# Patient Record
Sex: MALE
[De-identification: names, ages, dates, MRNs, and addresses within clinical notes are randomized per-mention and may not be internally consistent; named-entity substitution may affect disease eponyms.]

---

## 2019-09-07 ENCOUNTER — HOSPITAL ENCOUNTER (EMERGENCY)
Dept: HOSPITAL 97 - ER | Age: 44
Discharge: HOME | End: 2019-09-07
Payer: SELF-PAY

## 2019-09-07 VITALS — TEMPERATURE: 98.7 F

## 2019-09-07 VITALS — DIASTOLIC BLOOD PRESSURE: 88 MMHG | SYSTOLIC BLOOD PRESSURE: 159 MMHG | OXYGEN SATURATION: 96 %

## 2019-09-07 DIAGNOSIS — N13.2: Primary | ICD-10-CM

## 2019-09-07 DIAGNOSIS — I10: ICD-10-CM

## 2019-09-07 LAB
ALBUMIN SERPL BCP-MCNC: 4 G/DL (ref 3.4–5)
ALP SERPL-CCNC: 94 U/L (ref 45–117)
ALT SERPL W P-5'-P-CCNC: 33 U/L (ref 12–78)
AST SERPL W P-5'-P-CCNC: 23 U/L (ref 15–37)
BUN BLD-MCNC: 12 MG/DL (ref 7–18)
GLUCOSE SERPLBLD-MCNC: 139 MG/DL (ref 74–106)
HCT VFR BLD CALC: 42.8 % (ref 39.6–49)
LIPASE SERPL-CCNC: 101 U/L (ref 73–393)
LYMPHOCYTES # SPEC AUTO: 1.9 K/UL (ref 0.7–4.9)
PMV BLD: 9.1 FL (ref 7.6–11.3)
POTASSIUM SERPL-SCNC: 3.7 MMOL/L (ref 3.5–5.1)
RBC # BLD: 4.81 M/UL (ref 4.33–5.43)
UA COMPLETE W REFLEX CULTURE PNL UR: (no result)

## 2019-09-07 PROCEDURE — 74176 CT ABD & PELVIS W/O CONTRAST: CPT

## 2019-09-07 PROCEDURE — 99284 EMERGENCY DEPT VISIT MOD MDM: CPT

## 2019-09-07 PROCEDURE — 36415 COLL VENOUS BLD VENIPUNCTURE: CPT

## 2019-09-07 PROCEDURE — 80076 HEPATIC FUNCTION PANEL: CPT

## 2019-09-07 PROCEDURE — 83690 ASSAY OF LIPASE: CPT

## 2019-09-07 PROCEDURE — 80048 BASIC METABOLIC PNL TOTAL CA: CPT

## 2019-09-07 PROCEDURE — 96374 THER/PROPH/DIAG INJ IV PUSH: CPT

## 2019-09-07 PROCEDURE — 81015 MICROSCOPIC EXAM OF URINE: CPT

## 2019-09-07 PROCEDURE — 76377 3D RENDER W/INTRP POSTPROCES: CPT

## 2019-09-07 PROCEDURE — 85025 COMPLETE CBC W/AUTO DIFF WBC: CPT

## 2019-09-07 PROCEDURE — 96375 TX/PRO/DX INJ NEW DRUG ADDON: CPT

## 2019-09-07 NOTE — ER
Nurse's Notes                                                                                     

 [facility]                                                                 

Name: Aamir Rosa                                                                                 

Age: 44 yrs                                                                                       

Sex: Male                                                                                         

: 1975                                                                                   

MRN: [id number]                                                                                   

Arrival Date: 2019                                                                          

Time: 01:21                                                                                       

Account#: [id number]                                                                            

Bed 6                                                                                             

Private MD:                                                                                       

Diagnosis: Hydronephrosis with renal and ureteral calculous obstruction                           

                                                                                                  

Presentation:                                                                                     

                                                                                             

01:44 Presenting complaint: Patient states: he is having right sided abdominal pain radiating bb  

      to the umbilical area for approx 1 hour and vomited x 1, denies dysuria. Transition of      

      care: patient was not received from another setting of care. Onset of symptoms was          

      2019. Risk Assessment: Do you want to hurt yourself or someone else?          

      Patient reports no desire to harm self or others. Initial Sepsis Screen: Does the           

      patient meet any 2 criteria? No. Patient's initial sepsis screen is negative. Does the      

      patient have a suspected source of infection? No. Patient's initial sepsis screen is        

      negative. Care prior to arrival: None.                                                      

01:44 Method Of Arrival: Ambulatory                                                           bb  

01:44 Acuity: CALEB 3                                                                           bb  

                                                                                                  

Triage Assessment:                                                                                

02:26 General: Appears in no apparent distress. Behavior is calm, cooperative. Pain:          ak1 

      Complains of pain in right lower quadrant and left lower quadrant. EENT: No signs           

      and/or symptoms were reported regarding the EENT system. Neuro: Level of Consciousness      

      is awake, alert, obeys commands, Moves all extremities. Gait is steady, Speech is           

      normal. Cardiovascular: No deficits noted. Respiratory: Airway is patent Respiratory        

      effort is even, unlabored, Respiratory pattern is regular. GI: Abdomen is round Bowel       

      sounds present X 4 quads. Reports lower abdominal pain, nausea, vomiting, since 0100.       

      : No signs and/or symptoms were reported regarding the genitourinary system. Derm: No     

      signs and/or symptoms reported regarding the dermatologic system. Musculoskeletal: No       

      signs and/or symptoms reported regarding the musculoskeletal system.                        

                                                                                                  

Historical:                                                                                       

- Allergies:                                                                                      

01:45 No Known Allergies;                                                                     bb  

- Home Meds:                                                                                      

:45 None [Active];                                                                          bb  

- PMHx:                                                                                           

01:45 Hypertension;                                                                           bb  

- PSHx:                                                                                           

01:45 None;                                                                                   bb  

                                                                                                  

- Immunization history:: Adult Immunizations up to date.                                          

- Social history:: Smoking status: Patient/guardian denies using tobacco.                         

- Ebola Screening: : No symptoms or risks identified at this time.                                

                                                                                                  

                                                                                                  

Screenin:25 Abuse screen: Denies threats or abuse. Denies injuries from another. Nutritional        ak1 

      screening: No deficits noted. Tuberculosis screening: No symptoms or risk factors           

      identified. Fall Risk None identified.                                                      

                                                                                                  

Assessment:                                                                                       

02:27 Reassessment: Patient appears in no apparent distress at this time. No changes from     ak1 

      previously documented assessment. Patient and/or family updated on plan of care and         

      expected duration. Pain level reassessed. Patient is alert, oriented x 3, equal             

      unlabored respirations, skin warm/dry/pink. see triage assessment.                          

03:34 Reassessment: Patient appears in no apparent distress at this time. Patient and/or      ak1 

      family updated on plan of care and expected duration. Pain level reassessed. Patient is     

      alert, oriented x 3, equal unlabored respirations, skin warm/dry/pink. Patient states       

      feeling better.                                                                             

                                                                                                  

Vital Signs:                                                                                      

01:45  / 105; Pulse 68; Resp 16 S; Temp 98.7(O); Pulse Ox 94% on R/A; Weight 129.27 kg  bb  

      (R); Height 5 ft. 5 in. (165.10 cm) (R); Pain 9/10;                                         

03:34  / 88; Pulse 68; Resp 16; Temp 98.7; Pulse Ox 96% on R/A;                         ak1 

01:45 Body Mass Index 47.43 (129.27 kg, 165.10 cm)                                              

                                                                                                  

ED Course:                                                                                        

01:21 Patient arrived in ED.                                                                  ds1 

01:35 Kalpesh Ruff MD is Attending Physician.                                              gs  

01:42 Ryann Renteria, RN is Primary Nurse.                                                     ak1 

01:45 Triage completed.                                                                       bb  

01:45 Arm band placed on Patient placed in an exam room, on a stretcher, on pulse oximetry.   bb  

      Family accompanied patient.                                                                 

02:16 Patient has correct armband on for positive identification. Bed in low position. Call   ak1 

      light in reach. Side rails up X 1. Adult w/ patient. Pulse ox on. NIBP on.                  

02:16 Initial lab(s) drawn, by me, sent to lab. Urine collected: clean catch specimen. Missed ak1 

      attempt(s): 20 gauge in right antecubital area.                                             

02:16 Inserted saline lock: 22 gauge in left forearm, using aseptic technique. ,using aseptic ak1 

      technique. placed by Nicolette LEDBETTER RN.                                                         

03:06 CT Stone Protocol In Process Unspecified.                                               EDMS

03:43 Lane Vinson MD is Referral Physician.                                                

03:48 No provider procedures requiring assistance completed. IV discontinued, intact,         ak1 

      bleeding controlled, No redness/swelling at site. Pressure dressing applied.                

                                                                                                  

Administered Medications:                                                                         

02:25 Drug: fentaNYL (PF) 25 mcg Route: IVP; Site: left forearm;                              ak1 

03:44 Follow up: Response: No adverse reaction; RASS: Alert and Calm (0)                        

03:33 Drug: TORadol - Ketorolac 15 mg Route: IVP; Site: left forearm;                         ak1 

03:45 Follow up: Response: No adverse reaction                                                  

03:44 Drug: Norco 5 mg-325 mg 1 tabs Route: PO;                                                 

03:48 Follow up: Response: No adverse reaction; Medication administered at discharge.; RASS:  ak1 

      Alert and Calm (0)                                                                          

                                                                                                  

                                                                                                  

Outcome:                                                                                          

03:43 Discharge ordered by MD.                                                                  

03:49 Discharged to home ambulatory, with family.                                             ak1 

03:49 Condition: good                                                                             

03:49 Discharge instructions given to patient, Instructed on discharge instructions, follow       

      up and referral plans. no drinking with medication, no driving heavy equipment,             

      medication usage, Demonstrated understanding of instructions, follow-up care,               

      medications, Prescriptions given X 1.                                                       

03:54 Patient left the ED.                                                                    ak1 

                                                                                                  

Signatures:                                                                                       

Dispatcher [employer]                           [facility]                                                 

Savi Hollis                                ds1                                                  

Narcisa Melgar RN RN bb Krenek, Amber, RN RN   ak1                                                  

Humberto Barrios                                                                                   

Kalpesh Ruff MD MD                                                      

                                                                                                  

**************************************************************************************************

## 2019-09-07 NOTE — EDPHYS
Physician Documentation                                                                           

 [facility]                                                                 

Name: Aamir Rosa                                                                                 

Age: 44 yrs                                                                                       

Sex: Male                                                                                         

: 1975                                                                                   

MRN: [id number]                                                                                   

Arrival Date: 2019                                                                          

Time: 01:21                                                                                       

Account#: [id number]                                                                            

Bed 6                                                                                             

Private MD:                                                                                       

ED Physician Kalpesh Ruff                                                                       

HPI:                                                                                              

                                                                                             

03:41 This 44 yrs old  Male presents to ER via Ambulatory with complaints of Side/Rib gs  

      Pain.                                                                                       

03:41 The patient complains of pain in the right mid back. The pain radiates to the abdomen.  gs  

      Onset: The symptoms/episode began/occurred acutely, just prior to arrival. Modifying        

      factors: The symptoms are alleviated by nothing. the symptoms are aggravated by             

      nothing. Associated signs and symptoms: Pertinent positives: nausea. Severity of pain:      

      At its worst the pain was severe in the emergency department the pain is unchanged. The     

      patient has not experienced similar symptoms in the past. The patient has not recently      

      seen a physician.                                                                           

                                                                                                  

Historical:                                                                                       

- Allergies:                                                                                      

01:45 No Known Allergies;                                                                     bb  

- Home Meds:                                                                                      

01:45 None [Active];                                                                          bb  

- PMHx:                                                                                           

01:45 Hypertension;                                                                           bb  

- PSHx:                                                                                           

01:45 None;                                                                                   bb  

                                                                                                  

- Immunization history:: Adult Immunizations up to date.                                          

- Social history:: Smoking status: Patient/guardian denies using tobacco.                         

- Ebola Screening: : No symptoms or risks identified at this time.                                

                                                                                                  

                                                                                                  

ROS:                                                                                              

03:41 All other systems are negative.                                                         gs  

                                                                                                  

Exam:                                                                                             

03:41 Head/Face:  Normocephalic, atraumatic. Eyes:  Pupils equal round and reactive to light, gs  

      extra-ocular motions intact.  Lids and lashes normal.  Conjunctiva and sclera are           

      non-icteric and not injected.  Cornea within normal limits.  Periorbital areas with no      

      swelling, redness, or edema. ENT:  Nares patent. No nasal discharge, no septal              

      abnormalities noted.  Tympanic membranes are normal and external auditory canals are        

      clear.  Oropharynx with no redness, swelling, or masses, exudates, or evidence of           

      obstruction, uvula midline.  Mucous membranes moist. Neck:  Trachea midline, no             

      thyromegaly or masses palpated, and no cervical lymphadenopathy.  Supple, full range of     

      motion without nuchal rigidity, or vertebral point tenderness.  No Meningismus.             

      Chest/axilla:  Normal chest wall appearance and motion.  Nontender with no deformity.       

      No lesions are appreciated. Cardiovascular:  Regular rate and rhythm with a normal S1       

      and S2.  No gallops, murmurs, or rubs.  Normal PMI, no JVD.  No pulse deficits.             

      Respiratory:  Lungs have equal breath sounds bilaterally, clear to auscultation and         

      percussion.  No rales, rhonchi or wheezes noted.  No increased work of breathing, no        

      retractions or nasal flaring. Back:  No spinal tenderness.  No costovertebral               

      tenderness.  Full range of motion. Skin:  Warm, dry with normal turgor.  Normal color       

      with no rashes, no lesions, and no evidence of cellulitis. MS/ Extremity:  Pulses           

      equal, no cyanosis.  Neurovascular intact.  Full, normal range of motion. Neuro:  Awake     

      and alert, GCS 15, oriented to person, place, time, and situation.  Cranial nerves          

      II-XII grossly intact.  Motor strength 5/5 in all extremities.  Sensory grossly intact.     

       Cerebellar exam normal.  Normal gait.                                                      

03:41 Constitutional: The patient appears alert, awake, uncomfortable.                            

03:41 Abdomen/GI: Palpation: mild abdominal tenderness, in the right upper quadrant and right     

      lower quadrant.                                                                             

                                                                                                  

Vital Signs:                                                                                      

01:45  / 105; Pulse 68; Resp 16 S; Temp 98.7(O); Pulse Ox 94% on R/A; Weight 129.27 kg  bb  

      (R); Height 5 ft. 5 in. (165.10 cm) (R); Pain 9/10;                                         

03:34  / 88; Pulse 68; Resp 16; Temp 98.7; Pulse Ox 96% on R/A;                         ak1 

01:45 Body Mass Index 47.43 (129.27 kg, 165.10 cm)                                              

                                                                                                  

MDM:                                                                                              

01:52 Patient medically screened.                                                               

03:41 Differential diagnosis: nephrolithiasis, pyelonephritis, UTI. Data reviewed: vital      gs  

      signs, nurses notes, lab test result(s), radiologic studies. Response to treatment: the     

      patient's symptoms have markedly improved after treatment, the patient's condition has      

      returned to base line, and as a result, I will discharge patient.                           

                                                                                                  

                                                                                             

01:53 Order name: Basic Metabolic Panel; Complete Time: 02:44                                   

                                                                                             

01:53 Order name: CBC with Diff; Complete Time: 02:44                                           

                                                                                             

01:53 Order name: Hepatic Function; Complete Time: 02:44                                        

                                                                                             

01:53 Order name: Lipase; Complete Time: 02:44                                                  

                                                                                             

01:53 Order name: CT Stone Protocol                                                             

                                                                                             

01:53 Order name: Urine Microscopic Only; Complete Time: 02:44                                  

                                                                                             

01:53 Order name: IV Saline Lock; Complete Time: 02:16                                          

                                                                                             

01:53 Order name: Labs collected and sent; Complete Time: 02:16                                 

                                                                                             

01:53 Order name: Urine Dipstick-Ancillary (obtain specimen); Complete Time: 02:25              

                                                                                                  

Administered Medications:                                                                         

02:25 Drug: fentaNYL (PF) 25 mcg Route: IVP; Site: left forearm;                              ak1 

03:44 Follow up: Response: No adverse reaction; RASS: Alert and Calm (0)                        

03:33 Drug: TORadol - Ketorolac 15 mg Route: IVP; Site: left forearm;                         ak1 

03:45 Follow up: Response: No adverse reaction                                                  

03:44 Drug: Norco 5 mg-325 mg 1 tabs Route: PO;                                                 

03:48 Follow up: Response: No adverse reaction; Medication administered at discharge.; RASS:  ak1 

      Alert and Calm (0)                                                                          

                                                                                                  

                                                                                                  

Disposition:                                                                                      

19 03:43 Discharged to Home. Impression: Hydronephrosis with renal and ureteral             

  calculous obstruction.                                                                          

- Condition is Stable.                                                                            

- Discharge Instructions: Kidney Stones, Hydronephrosis.                                          

- Prescriptions for Tylenol- Codeine #4 300-60 mg Oral Tablet - take 1 tablet by ORAL             

  route every 6 hours As needed; 10 tablet.                                                       

- Medication Reconciliation Form, Thank You Letter, Antibiotic Education, Prescription            

  Opioid Use form.                                                                                

- Follow up: Lane Vinson MD; When: 2 - 3 days; Reason: Re-evaluation by your                 

  physician.                                                                                      

                                                                                                  

                                                                                                  

                                                                                                  

Signatures:                                                                                       

Dispatcher [employer]                           Narcisa Sow RN                     RN   Ryann Santoro RN                       RN   ak1                                                  

Humberto Barrios                                                                                   

Kalpesh Ruff MD MD                                                      

                                                                                                  

Corrections: (The following items were deleted from the chart)                                    

03:54 03:43 2019 03:43 Discharged to Home. Impression: Hydronephrosis with renal and    ak1 

      ureteral calculous obstruction. Condition is Stable. Forms are Medication                   

      Reconciliation Form, Thank You Letter, Antibiotic Education, Prescription Opioid Use.       

      Follow up: Lane Vinson; When: 2 - 3 days; Reason: Re-evaluation by your physician. gs    

                                                                                                  

**************************************************************************************************

## 2019-09-10 NOTE — RAD REPORT
EXAM DESCRIPTION:  CT - Stone Protocol - 9/8/2019 6:53 am

 

CLINICAL HISTORY:  ABD PAIN

 

COMPARISON:  None Available.

 

TECHNIQUE:  CT of the abdomen and pelvis without IV contrast. Evaluation of the solid organs and vasc
ulature is suboptimal due to lack of IV contrast.

DLP:  1375.8 mGy-cm

 

FINDINGS:  Lung Bases: The visualized   lung bases are clear.

Bones: No destructive bone lesions identified.

Abdomen:

Liver: The liver has normal size and density.

Gallbladder: No calcified gallstones.

Spleen, Pancreas, and Adrenal Glands:   The spleen, pancreas, and adrenal glands are unremarkable.

Kidneys: There is a 0.3 cm obstructing calculus in the distal right ureter producing moderate right h
ydroureter and hydronephrosis. No left-sided hydronephrosis. Punctate nonobstructing left nephrolithi
asis.

Vasculature: The aorta and IVC have normal caliber and position.

Stomach:   The stomach and duodenum have normal course.

Other:   No free intraperitoneal air.   No free fluid or lymphadenopathy.

Pelvis:

Bladder:   Urinary bladder is unremarkable.

Bowel:   No dilated loops of large or small bowel.

Appendix:   Normal appendix.

Pelvis: Prostate is not enlarged.

 

IMPRESSION:  1. There is a 0.3 cm obstructing calculus in the distal right ureter producing mild righ
t hydroureter and hydronephrosis.

2. Nonobstructing left nephrolithiasis.

This exam was performed according to our departmental dose-optimization program, which includes autom
ated exposure control, adjustment of the mA and/or kV according to patient size and/or use of iterati
ve reconstruction technique.

 

Electronically signed by:   Krunal Tinoco   9/7/2019 3:28 AM CDT Workstation: [id number]

 

 

Due to temporary technical issues with the PACS/Fluency reporting system, reports are being signed by
 the in house radiologist as a courtesy to ensure prompt reporting. The interpreting radiologist is f
ully responsible for the content of the report.